# Patient Record
Sex: FEMALE | URBAN - METROPOLITAN AREA
[De-identification: names, ages, dates, MRNs, and addresses within clinical notes are randomized per-mention and may not be internally consistent; named-entity substitution may affect disease eponyms.]

---

## 2023-04-13 ENCOUNTER — WEB ENCOUNTER (OUTPATIENT)
Dept: URBAN - METROPOLITAN AREA CLINIC 60 | Facility: CLINIC | Age: 78
End: 2023-04-13

## 2023-04-17 ENCOUNTER — TELEPHONE ENCOUNTER (OUTPATIENT)
Dept: URBAN - METROPOLITAN AREA CLINIC 60 | Facility: CLINIC | Age: 78
End: 2023-04-17

## 2023-04-19 ENCOUNTER — DASHBOARD ENCOUNTERS (OUTPATIENT)
Age: 78
End: 2023-04-19

## 2023-04-19 ENCOUNTER — OFFICE VISIT (OUTPATIENT)
Dept: URBAN - METROPOLITAN AREA CLINIC 60 | Facility: CLINIC | Age: 78
End: 2023-04-19

## 2023-04-19 PROBLEM — 34000006: Status: ACTIVE | Noted: 2023-04-19

## 2023-04-19 RX ORDER — LORAZEPAM 1 MG/1
1 TABLET AT BEDTIME AS NEEDED TABLET ORAL ONCE A DAY
Status: ACTIVE | COMMUNITY
Start: 2023-04-19

## 2023-04-19 RX ORDER — BALSALAZIDE DISODIUM 750 MG/1
2 CAPSULES CAPSULE ORAL TWICE A DAY
Status: ACTIVE | COMMUNITY

## 2023-04-19 RX ORDER — BISOPROLOL FUMARATE 5 MG/1
1 TABLET TABLET, FILM COATED ORAL ONCE A DAY
Qty: 30 | Status: ACTIVE | COMMUNITY
Start: 2023-04-19

## 2023-04-19 RX ORDER — CEPHALEXIN 250 MG/5ML
AS DIRECTED POWDER, FOR SUSPENSION ORAL
Status: ACTIVE | COMMUNITY

## 2023-04-19 RX ORDER — DRONABINOL 2.5 MG/1
1 CAPSULE IN THE EVENING OR AT BEDTIME CAPSULE ORAL ONCE A DAY
Status: ACTIVE | COMMUNITY
Start: 2023-04-19

## 2023-04-19 RX ORDER — MORPHINE SULFATE 100 MG/5ML
1 ML AS NEEDED SOLUTION ORAL
Status: ACTIVE | COMMUNITY
Start: 2023-04-19

## 2023-04-19 RX ORDER — ELECTROLYTES/DEXTROSE
2 TABLETS SOLUTION, ORAL ORAL ONCE A DAY
Qty: 60 | Status: ACTIVE | COMMUNITY
Start: 2023-04-19 | End: 2023-05-19

## 2023-04-19 NOTE — HPI-TODAY'S VISIT:
78-year-old female with history of Crohn's disease, and hypertension presents to the office for follow-up after recent admission to [facility] from March 22, 2023 through March 31, 2023. She presented to the ER on March 22, 2023 with complaints of progressively worsening abdominal pain over about 1 week with diminished appetite.  She reported diminished appetite, loose stools and vomiting.  She has a history of dementia with expressive aphasia and the history was obtained from the patient's .  Her labs on admission demonstrated a hemoglobin 16.1, white blood cell count 8.5 she had a mild elevation of her ALT to 36, creatinine 1.16, normal lipase.  Abdominal x-ray showed multiple dilated fluid-filled small bowel consistent with small bowel obstruction.  CT scan of the abdomen and pelvis with contrast showed dilation with air-fluid levels involving multiple loops of small bowel.  Indeterminate soft tissue density involving the central mesentery with twisting of the mesenteric vasculature measuring 2 x 1.9 cm.  Cannot exclude underlying lesion versus prominent lymph node.  The findings were concerning for obstruction.  She was followed by general surgery.  She denied a prior history of bowel obstructions.  She had no prior abdominal surgeries.  She denied any recent Crohn's flares.  She had been treated with mercaptopurine for years but was not taking it and her  had restarted her medication 2 days prior to her admission.  NG tube was ordered but the patient declined She had positive blood cultures with Staph epidermidis and infectious disease was consulted who suspected this was a contaminant.  Repeat blood cultures were negative. CT enterography was ordered but she was unable to tolerate the oral contrast.  Gastroenterology was consulted and saw the patient.  She reported being diagnosed with Crohn's disease 30 years ago.  She had never had any history of small bowel obstruction related to Crohn's disease.  She was treated in the past with balsalazide which was managed by her PCP in [place].  She had not seen outpatient gastroenterology.  Her stool studies were negative for pathogens including C. difficile.  Fecal calprotectin was elevated to 362.  CRP was elevated at 1.2.  She was treated with steroids with improvement in her symptoms.  She was discharged home with hospice to follow on a Medrol dose pack.  She was advised to continue her balsalazide and mercaptopurine. Last colonoscopy was done about 6 years ago.